# Patient Record
Sex: FEMALE | Race: WHITE
[De-identification: names, ages, dates, MRNs, and addresses within clinical notes are randomized per-mention and may not be internally consistent; named-entity substitution may affect disease eponyms.]

---

## 2018-05-05 ENCOUNTER — HOSPITAL ENCOUNTER (EMERGENCY)
Dept: HOSPITAL 36 - ER | Age: 23
LOS: 1 days | Discharge: ANOTHER HOSPITAL | End: 2018-05-06
Payer: MEDICAID

## 2018-05-05 DIAGNOSIS — Z3A.00: ICD-10-CM

## 2018-05-05 DIAGNOSIS — R10.30: ICD-10-CM

## 2018-05-05 DIAGNOSIS — F17.200: ICD-10-CM

## 2018-05-05 DIAGNOSIS — O00.00: Primary | ICD-10-CM

## 2018-05-05 LAB
ALBUMIN SERPL-MCNC: 4.4 GM/DL (ref 3.7–5.3)
ALBUMIN/GLOB SERPL: 1.9 {RATIO} (ref 1–1.8)
ALP SERPL-CCNC: 49 U/L (ref 34–104)
ALT SERPL-CCNC: 9 U/L (ref 7–52)
AMPHET UR-MCNC: NEGATIVE NG/ML
ANION GAP SERPL CALC-SCNC: 10.5 MMOL/L (ref 7–16)
APPEARANCE UR: CLEAR
AST SERPL-CCNC: 12 U/L (ref 13–39)
BACTERIA #/AREA URNS HPF: (no result) /HPF
BARBITURATES UR-MCNC: NEGATIVE UG/ML
BASOPHILS # BLD AUTO: 0.1 TH/CUMM (ref 0–0.2)
BASOPHILS NFR BLD AUTO: 0.5 % (ref 0–2)
BENZODIAZEPINES PNL UR: NEGATIVE
BILIRUB SERPL-MCNC: 0.5 MG/DL (ref 0.3–1)
BILIRUB UR-MCNC: (no result) MG/DL
BUN SERPL-MCNC: 15 MG/DL (ref 7–25)
CALCIUM SERPL-MCNC: 9.4 MG/DL (ref 8.6–10.3)
CANNABINOIDS SERPL QL CFM: POSITIVE
CHLORIDE SERPL-SCNC: 105 MEQ/L (ref 98–107)
CO2 SERPL-SCNC: 25.4 MEQ/L (ref 21–31)
COCAINE METAB.OTHER UR-MCNC: NEGATIVE NG/ML
COLOR UR: YELLOW
CREAT SERPL-MCNC: 0.7 MG/DL (ref 0.6–1.2)
EOSINOPHIL # BLD AUTO: 0.2 TH/CMM (ref 0.1–0.4)
EOSINOPHIL NFR BLD AUTO: 1.2 % (ref 0–5)
EPI CELLS URNS QL MICRO: (no result) /LPF
ERYTHROCYTE [DISTWIDTH] IN BLOOD BY AUTOMATED COUNT: 12.1 % (ref 11.5–20)
GLOBULIN SER-MCNC: 2.3 GM/DL
GLUCOSE SERPL-MCNC: 125 MG/DL (ref 70–105)
GLUCOSE UR STRIP-MCNC: NEGATIVE MG/DL
HCT VFR BLD CALC: 34.6 % (ref 41–60)
HGB BLD-MCNC: 11.6 GM/DL (ref 12–16)
KETONES UR STRIP-MCNC: NEGATIVE MG/DL
LEUKOCYTE ESTERASE UR-ACNC: NEGATIVE
LYMPHOCYTE AB SER FC-ACNC: 1.9 TH/CMM (ref 1.5–3)
LYMPHOCYTES NFR BLD AUTO: 13.8 % (ref 20–50)
MCH RBC QN AUTO: 30 PG (ref 27–31)
MCHC RBC AUTO-ENTMCNC: 33.4 PG (ref 28–36)
MCV RBC AUTO: 89.6 FL (ref 81–100)
METHADONE UR CFM-MCNC: NEGATIVE NG/ML
METHAMPHET UR QL: NEGATIVE
MICRO URNS: YES
MONOCYTES # BLD AUTO: 1.1 TH/CMM (ref 0.3–1)
MONOCYTES NFR BLD AUTO: 8.1 % (ref 2–10)
NEUTROPHILS # BLD: 10.3 TH/CMM (ref 1.8–8)
NEUTROPHILS NFR BLD AUTO: 76.4 % (ref 40–80)
NITRITE UR QL STRIP: NEGATIVE
OPIATES UR QL: NEGATIVE
PCP UR-MCNC: NEGATIVE UG/L
PH UR STRIP: 6 [PH] (ref 4.6–8)
PLATELET # BLD: 241 TH/CMM (ref 150–400)
PMV BLD AUTO: 7.9 FL
POTASSIUM SERPL-SCNC: 3.9 MEQ/L (ref 3.5–5.1)
PROT UR STRIP-MCNC: 30 MG/DL
RBC # BLD AUTO: 3.87 MIL/CMM (ref 3.8–5.1)
RBC # UR STRIP: (no result) /UL
RBC #/AREA URNS HPF: (no result) /HPF (ref 0–5)
SODIUM SERPL-SCNC: 137 MEQ/L (ref 136–145)
SP GR UR STRIP: >= 1.03 (ref 1–1.03)
TRICYCLICS UR QL: NEGATIVE
URINALYSIS COMPLETE PNL UR: (no result)
UROBILINOGEN UR STRIP-ACNC: 0.2 E.U./DL (ref 0.2–1)
WBC # BLD AUTO: 13.6 TH/CMM (ref 4.8–10.8)
WBC #/AREA URNS HPF: (no result) /HPF (ref 0–5)

## 2018-05-05 PROCEDURE — 99285 EMERGENCY DEPT VISIT HI MDM: CPT

## 2018-05-05 PROCEDURE — 96365 THER/PROPH/DIAG IV INF INIT: CPT

## 2018-05-05 PROCEDURE — 84703 CHORIONIC GONADOTROPIN ASSAY: CPT

## 2018-05-05 PROCEDURE — 87040 BLOOD CULTURE FOR BACTERIA: CPT

## 2018-05-05 PROCEDURE — 81001 URINALYSIS AUTO W/SCOPE: CPT

## 2018-05-05 PROCEDURE — 81025 URINE PREGNANCY TEST: CPT

## 2018-05-05 PROCEDURE — 85610 PROTHROMBIN TIME: CPT

## 2018-05-05 PROCEDURE — 85025 COMPLETE CBC W/AUTO DIFF WBC: CPT

## 2018-05-05 PROCEDURE — 80307 DRUG TEST PRSMV CHEM ANLYZR: CPT

## 2018-05-05 PROCEDURE — 96361 HYDRATE IV INFUSION ADD-ON: CPT

## 2018-05-05 PROCEDURE — 84702 CHORIONIC GONADOTROPIN TEST: CPT

## 2018-05-05 PROCEDURE — 96375 TX/PRO/DX INJ NEW DRUG ADDON: CPT

## 2018-05-05 PROCEDURE — 85007 BL SMEAR W/DIFF WBC COUNT: CPT

## 2018-05-05 PROCEDURE — 36415 COLL VENOUS BLD VENIPUNCTURE: CPT

## 2018-05-05 PROCEDURE — 85027 COMPLETE CBC AUTOMATED: CPT

## 2018-05-05 PROCEDURE — 76801 OB US < 14 WKS SINGLE FETUS: CPT

## 2018-05-05 PROCEDURE — 83605 ASSAY OF LACTIC ACID: CPT

## 2018-05-05 PROCEDURE — 85730 THROMBOPLASTIN TIME PARTIAL: CPT

## 2018-05-05 PROCEDURE — 80053 COMPREHEN METABOLIC PANEL: CPT

## 2018-05-05 NOTE — ED PHYSICIAN CHART
ED Chief Complaint/HPI





- Patient Information


Date Seen:: 05/05/18


Time Seen:: 20:43


Chief Complaint:: ABDOMINAL PAIN


History of Present Illness:: 





THIS IS A 23 YO FEMALE WITH PAIN IN THE LOWER ABDOMEN THAT STARTED ABOUT ONE 

MONTHS AGO AND IS NOW THE WORSE THAT IT HAS BEEN.


SHE DENIES ANY PREGNANCY BECAUSE HER DOCTOR GOT A NEGATIVE PREGNANCY TEST, 

PAINFUL URINATION AND VOMITED THIS PM.  SHE DENIES HAVING ANY PREVIOUS SURGERY 

AND HAS A TWO YR OLD AT HOME.  SHE DENIES FEVER AND VOMITING 


Vitals:: 





NOTED


Historian:: Patient


Review:: Nurse's Note Reviewed





ED Review of Systems





- Review of Systems


General/Constitutional: No fever, No chills, No weight loss, Weakness, 

Diaphoresis, No edema, No loss of appetite


Skin: No skin lesions, No rash, No bruising


Head: No headache, No light-headedness


Eyes: No loss of vision, No pain, No diplopia


ENT: No earache, No nasal drainage, No sore throat, No tinnitus


Neck: No neck pain, No swelling, No thyromegaly, No stiffness, No mass noted


Cardio Vascular: No chest pain, No palpitations, No PND, No orthopnea, No edema


Pulmonary: No SOB, No cough, No sputum, No wheezing


GI: Nausea, No vomiting, No diarrhea, Pain, No melena, No hematochezia, No 

constipation, No hematemesis


G/U: Dysuria, No frequency, No hematuria


Ob/Gyn: No vaginal discharge


Musculoskeletal: No bone or joint pain, No back pain, No muscle pain


Endocrine: No polyuria, No polydipsia


Psychiatric: No prior psych history, No depression, No anxiety, No suicidal 

ideation


Hematopoietic: No bruising, No lymphadenopathy


Allergic/Immuno: No urticaria, No angioedema


Neurological: No syncope, No focal symptoms, No weakness, No paresthesia, No 

headache, No seizure, No dizziness, No confusion, No vertigo





ED Past Medical History





- Past Medical History


Obtainable: Yes


Past Medical History: No significant medical hx


Family History: None


Social History: Smoker, No Alcohol, No Drug Use, Single, Lives With Parents


Surgical History: None


Psychiatricy History: None


Medication: Reviewed





Family Medical History





- Family Member


  ** Mother


History Unknown: Yes





ED Physical Exam





- Physical Examination


General/Constitutional: Awake, Well-developed, well-nourished, Alert, No 

distress, GCS 15, Non-toxic appearing, Ambulatory


Other Gen/Cons comments:: 





THE IS DIAPHORETIC AND PALE


Head: Atraumatic


Eyes: Lids, conjuctiva normal, PERRL, EOMI


Skin: Nl inspection, No rash, No skin lesions, No ecchymosis, Well hydrated, No 

lymphadenopathy


ENMT: External ears, nose nl, Nasal exam nl, Lips, teeth, gums nl


Neck: Nontender, Full ROM w/o pain, No JVD, No nuchal rigidity, No bruit, No 

mass, No stridor


Respiratory: Nl effort/Exclusion, Clear to Auscultation, No Wheeze/Rhonchi/Rales


Cardio Vascular: RRR, No murmur, gallop, rubs, NL S1 S2


GI: No tenderness/rebounding/guarding (DIFFUSE TENDERNESS IN THE LOWER 

ABDOMINAL AREA.), No organomegaly, No hernia, Normal BS's, Nondistended, No mass

/bruits, No McBurney tenderness


: No CVA tenderness


Extremities: No tenderness or effusion, Full ROM, normal strength in all 

extremities, No edema, Normal digits & nails


Neuro/Psych: Alert/oriented, DTR's symmetric, Normal sensory exam, Normal motor 

strength, Judgement/insight normal, Mood normal, Normal gait, No focal deficits


Misc: Normal back, No paraspinal tenderness





ED Labs/Radiology/EKG Results





- Lab Results


Results: 





 Abnormal Lab Results











  05/05/18 05/05/18 05/05/18





  20:50 20:50 20:50


 


WBC  13.6 H  


 


RBC  3.87  


 


Hgb  11.6 L  


 


Hct  34.6 L  


 


MCV  89.6  


 


MCH  30.0  


 


MCHC Differential  33.4  


 


RDW  12.1  


 


Plt Count  241  


 


MPV  7.9  


 


Neutrophils %  76.4  


 


Lymphocytes %  13.8 L  


 


Monocytes %  8.1  


 


Eosinophils %  1.2  


 


Basophils %  0.5  


 


Sodium   137 


 


Potassium   3.9 


 


Chloride   105 


 


Carbon Dioxide   25.4 


 


Anion Gap   10.5 


 


BUN   15 


 


Creatinine   0.7 


 


Est GFR ( Amer)   > 60.0 


 


Est GFR (Non-Af Amer)   > 60.0 


 


BUN/Creatinine Ratio   21.4 


 


Glucose   125 H 


 


Whole Bld Lactic Acid   


 


Calcium   9.4 


 


Total Bilirubin   0.5 


 


AST   12 L 


 


ALT   9 


 


Alkaline Phosphatase   49 


 


Total Protein   6.7 


 


Albumin   4.4 


 


Globulin   2.3 


 


Albumin/Globulin Ratio   1.9 H 


 


Serum Pregnancy, Qual    POSITIVE H


 


Urine Source   


 


Urine Color   


 


Urine Clarity   


 


Urine pH   


 


Ur Specific Gravity   


 


Urine Protein   


 


Urine Glucose (UA)   


 


Urine Ketones   


 


Urine Blood   


 


Urine Nitrate   


 


Urine Bilirubin   


 


Urine Urobilinogen   


 


Ur Leukocyte Esterase   


 


Urine RBC   


 


Urine WBC   


 


Ur Epithelial Cells   


 


Urine Bacteria   


 


Urine Mucus   


 


Urine Pregnancy Test   


 


Urine Opiates Screen   


 


Urine Methadone Screen   


 


Ur Barbiturates Screen   


 


Ur Tricyclics Screen   


 


Ur Phencyclidine Scrn   


 


Amphetamines Screen   


 


U Methamphetamines Scrn   


 


U Benzodiazepines Scrn   


 


U Cocaine Metab Screen   


 


U Cannabinoids Screen   














  05/05/18 05/05/18 05/05/18





  21:25 21:30 21:30


 


WBC   


 


RBC   


 


Hgb   


 


Hct   


 


MCV   


 


MCH   


 


MCHC Differential   


 


RDW   


 


Plt Count   


 


MPV   


 


Neutrophils %   


 


Lymphocytes %   


 


Monocytes %   


 


Eosinophils %   


 


Basophils %   


 


Sodium   


 


Potassium   


 


Chloride   


 


Carbon Dioxide   


 


Anion Gap   


 


BUN   


 


Creatinine   


 


Est GFR ( Amer)   


 


Est GFR (Non-Af Amer)   


 


BUN/Creatinine Ratio   


 


Glucose   


 


Whole Bld Lactic Acid  0.75  


 


Calcium   


 


Total Bilirubin   


 


AST   


 


ALT   


 


Alkaline Phosphatase   


 


Total Protein   


 


Albumin   


 


Globulin   


 


Albumin/Globulin Ratio   


 


Serum Pregnancy, Qual   


 


Urine Source   CLEAN C 


 


Urine Color   YELLOW 


 


Urine Clarity   CLEAR 


 


Urine pH   6.0 


 


Ur Specific Gravity   >= 1.030 


 


Urine Protein   30 H 


 


Urine Glucose (UA)   NEGATIVE 


 


Urine Ketones   NEGATIVE 


 


Urine Blood   SMALL H 


 


Urine Nitrate   NEGATIVE 


 


Urine Bilirubin   SMALL H 


 


Urine Urobilinogen   0.2 


 


Ur Leukocyte Esterase   NEGATIVE 


 


Urine RBC   0-2 


 


Urine WBC   0-2 


 


Ur Epithelial Cells   FEW 


 


Urine Bacteria   NONE SEEN 


 


Urine Mucus   FEW 


 


Urine Pregnancy Test    POSITIVE


 


Urine Opiates Screen   


 


Urine Methadone Screen   


 


Ur Barbiturates Screen   


 


Ur Tricyclics Screen   


 


Ur Phencyclidine Scrn   


 


Amphetamines Screen   


 


U Methamphetamines Scrn   


 


U Benzodiazepines Scrn   


 


U Cocaine Metab Screen   


 


U Cannabinoids Screen   














  05/05/18





  21:30


 


WBC 


 


RBC 


 


Hgb 


 


Hct 


 


MCV 


 


MCH 


 


MCHC Differential 


 


RDW 


 


Plt Count 


 


MPV 


 


Neutrophils % 


 


Lymphocytes % 


 


Monocytes % 


 


Eosinophils % 


 


Basophils % 


 


Sodium 


 


Potassium 


 


Chloride 


 


Carbon Dioxide 


 


Anion Gap 


 


BUN 


 


Creatinine 


 


Est GFR ( Amer) 


 


Est GFR (Non-Af Amer) 


 


BUN/Creatinine Ratio 


 


Glucose 


 


Whole Bld Lactic Acid 


 


Calcium 


 


Total Bilirubin 


 


AST 


 


ALT 


 


Alkaline Phosphatase 


 


Total Protein 


 


Albumin 


 


Globulin 


 


Albumin/Globulin Ratio 


 


Serum Pregnancy, Qual 


 


Urine Source 


 


Urine Color 


 


Urine Clarity 


 


Urine pH 


 


Ur Specific Gravity 


 


Urine Protein 


 


Urine Glucose (UA) 


 


Urine Ketones 


 


Urine Blood 


 


Urine Nitrate 


 


Urine Bilirubin 


 


Urine Urobilinogen 


 


Ur Leukocyte Esterase 


 


Urine RBC 


 


Urine WBC 


 


Ur Epithelial Cells 


 


Urine Bacteria 


 


Urine Mucus 


 


Urine Pregnancy Test 


 


Urine Opiates Screen  NEGATIVE


 


Urine Methadone Screen  NEGATIVE


 


Ur Barbiturates Screen  NEGATIVE


 


Ur Tricyclics Screen  NEGATIVE


 


Ur Phencyclidine Scrn  NEGATIVE


 


Amphetamines Screen  NEGATIVE


 


U Methamphetamines Scrn  NEGATIVE


 


U Benzodiazepines Scrn  NEGATIVE


 


U Cocaine Metab Screen  NEGATIVE


 


U Cannabinoids Screen  POSITIVE H














- Radiology Results


Results: 





ULTRASOUND OF THE PELVIS. THERE IS NO PREGNANCY IN THE UTERUS AND LOTS OF FREE 

FLUIDS IS NOTED IN THE PELVIC AREA.





ED Assessment





- Assessment


General Assessment: 





THIS PATIENT HAS ALL THE SIGNS OF ECTOPIC PREGNANCY SINCE SHE IS NOW HAVING 

PAIN IN HER SHOULDERS.


SINCE WE DO NOT HAVE OB-GYN AT THIS HOSPITAL AN EFFORT TO TRANSFER TO THE ER 

[facility] TO DR BASSETT.


 ACCEPTS THE PATIENT TO ER FOR TREATMENT.








ED Septic Shock





- .


Is Septic Shock (SBP<90, OR Lactate>4 mmol\L) present?: No





ED Reassessment (Disposition)





- Diagnosis


Diagnosis:: 





ECTOPIC PREGNANCY 





- Patient Disposition


Discharge/Transfer:: Acute Care (other hosp)





ED Discharge Plan





- Patient Disposition


Admit/Discharge/Transfer: TRANSFER TO ACUTE HOSP


Condition at Disposition: Improved


Additional Instructions: 


SPOKE TO DR. AZAR AND GAVE THE VITALS OVER THE PHONE AND THE CRITICAL LABS. 

HE AGREED TO A HIGHER LEVEL OF CARE.


911 WAS CALLED BECAUSE NO [facility] COULD PICK THE PATIENT SOON ENOUGH.

## 2018-05-06 ENCOUNTER — HOSPITAL ENCOUNTER (OUTPATIENT)
Dept: HOSPITAL 4 - SED | Age: 23
Discharge: HOME | End: 2018-05-06
Attending: SPECIALIST
Payer: MEDICAID

## 2018-05-06 VITALS — WEIGHT: 151 LBS | HEIGHT: 67 IN | SYSTOLIC BLOOD PRESSURE: 115 MMHG | BODY MASS INDEX: 23.7 KG/M2

## 2018-05-06 VITALS — SYSTOLIC BLOOD PRESSURE: 99 MMHG

## 2018-05-06 VITALS — SYSTOLIC BLOOD PRESSURE: 97 MMHG

## 2018-05-06 DIAGNOSIS — Z88.8: ICD-10-CM

## 2018-05-06 DIAGNOSIS — Z88.0: ICD-10-CM

## 2018-05-06 DIAGNOSIS — O00.102: Primary | ICD-10-CM

## 2018-05-06 LAB
BASOPHILS # BLD AUTO: 0.1 K/UL (ref 0–0.2)
BASOPHILS # BLD AUTO: 0.1 TH/CUMM (ref 0–0.2)
BASOPHILS NFR BLD AUTO: 0.5 % (ref 0–2)
BASOPHILS NFR BLD AUTO: 1.2 % (ref 0–2)
EOSINOPHIL # BLD AUTO: 0 K/UL (ref 0–0.4)
EOSINOPHIL # BLD AUTO: 0 TH/CMM (ref 0.1–0.4)
EOSINOPHIL NFR BLD AUTO: 0.2 % (ref 0–4)
EOSINOPHIL NFR BLD AUTO: 0.2 % (ref 0–5)
ERYTHROCYTE [DISTWIDTH] IN BLOOD BY AUTOMATED COUNT: 12.3 % (ref 9–15)
ERYTHROCYTE [DISTWIDTH] IN BLOOD BY AUTOMATED COUNT: 12.4 % (ref 11.5–20)
HCT VFR BLD AUTO: 27.4 % (ref 36–48)
HCT VFR BLD AUTO: 28.1 % (ref 36–48)
HCT VFR BLD AUTO: 29.7 % (ref 36–48)
HCT VFR BLD CALC: 29.5 % (ref 41–60)
HGB BLD-MCNC: 10.3 G/DL (ref 12–16)
HGB BLD-MCNC: 9.3 G/DL (ref 12–16)
HGB BLD-MCNC: 9.6 G/DL (ref 12–16)
HGB BLD-MCNC: 9.9 GM/DL (ref 12–16)
INR PPP: 1.07 (ref 0.5–1.4)
LYMPHOCYTE AB SER FC-ACNC: 0.9 TH/CMM (ref 1.5–3)
LYMPHOCYTES # BLD AUTO: 1.2 K/UL (ref 1–5.5)
LYMPHOCYTES NFR BLD AUTO: 11.5 % (ref 20.5–51.5)
LYMPHOCYTES NFR BLD AUTO: 7.2 % (ref 20–50)
MCH RBC QN AUTO: 30 PG (ref 27–31)
MCH RBC QN AUTO: 31 PG (ref 27–31)
MCHC RBC AUTO-ENTMCNC: 33.5 PG (ref 28–36)
MCHC RBC AUTO-ENTMCNC: 35 % (ref 32–36)
MCV RBC AUTO: 89 FL (ref 79–98)
MCV RBC AUTO: 89.6 FL (ref 81–100)
MONOCYTES # BLD AUTO: 0.7 TH/CMM (ref 0.3–1)
MONOCYTES # BLD MANUAL: 0.7 K/UL (ref 0–1)
MONOCYTES # BLD MANUAL: 6.7 % (ref 1.7–9.3)
MONOCYTES NFR BLD AUTO: 6.1 % (ref 2–10)
NEUTROPHILS # BLD AUTO: 8.8 K/UL (ref 1.8–7.7)
NEUTROPHILS # BLD: 10.3 TH/CMM (ref 1.8–8)
NEUTROPHILS NFR BLD AUTO: 81.1 % (ref 40–70)
NEUTROPHILS NFR BLD AUTO: 85.3 % (ref 40–80)
PLATELET # BLD AUTO: 213 K/UL (ref 130–430)
PLATELET # BLD: 192 TH/CMM (ref 150–400)
PMV BLD AUTO: 7.8 FL
PROTHROMBIN TIME: 11.1 SECONDS (ref 9.5–11.5)
RBC # BLD AUTO: 3.3 MIL/CMM (ref 3.8–5.1)
RBC # BLD AUTO: 3.35 MIL/UL (ref 4.2–6.2)
WBC # BLD AUTO: 10.8 K/UL (ref 4.8–10.8)
WBC # BLD AUTO: 12 TH/CMM (ref 4.8–10.8)

## 2018-05-06 PROCEDURE — 86886 COOMBS TEST INDIRECT TITER: CPT

## 2018-05-06 PROCEDURE — 86900 BLOOD TYPING SEROLOGIC ABO: CPT

## 2018-05-06 PROCEDURE — 36415 COLL VENOUS BLD VENIPUNCTURE: CPT

## 2018-05-06 PROCEDURE — 88305 TISSUE EXAM BY PATHOLOGIST: CPT

## 2018-05-06 PROCEDURE — 86901 BLOOD TYPING SEROLOGIC RH(D): CPT

## 2018-05-06 PROCEDURE — 84702 CHORIONIC GONADOTROPIN TEST: CPT

## 2018-05-06 PROCEDURE — 76801 OB US < 14 WKS SINGLE FETUS: CPT

## 2018-05-06 PROCEDURE — 85018 HEMOGLOBIN: CPT

## 2018-05-06 PROCEDURE — 85025 COMPLETE CBC W/AUTO DIFF WBC: CPT

## 2018-05-06 PROCEDURE — 87081 CULTURE SCREEN ONLY: CPT

## 2018-05-06 PROCEDURE — C1782 MORCELLATOR: HCPCS

## 2018-05-06 PROCEDURE — 59151 TREAT ECTOPIC PREGNANCY: CPT

## 2018-05-06 PROCEDURE — C1727 CATH, BAL TIS DIS, NON-VAS: HCPCS

## 2018-05-06 RX ADMIN — OXYCODONE HYDROCHLORIDE AND ACETAMINOPHEN PRN TAB: 5; 325 TABLET ORAL at 17:37

## 2018-05-06 RX ADMIN — OXYCODONE HYDROCHLORIDE AND ACETAMINOPHEN PRN TAB: 5; 325 TABLET ORAL at 13:48

## 2018-05-06 NOTE — DIAGNOSTIC IMAGING REPORT
OB ultrasound



HISTORY: Pain, positive pregnancy test



Transvaginal and transabdominal sonographic technique utilized, positive

pregnancy test The uterus measures 9.4 x 3.9 x 4.8 cm.  No focal

myometrial lesions are seen.  The endometrium measures 3 mm thickness. 

No intrauterine gestation identified.



The right ovary measures 3.1 x 3.1 cm.  Subcentimeter cysts are seen. 

The left ovary measures 2.2 x 2.5 cm.  Subcentimeter cysts are seen.



There is a small amount of free fluid in the lower pelvis.



IMPRESSION:

1.  No intrauterine gestation identified.  IN THE PRESENCE OF A

POSITIVE PREGNANCY TEST, ECTOPIC GESTATION MUST BE EXCLUDED.

2.  Subcentimeter bilateral ovarian cystic changes

3.  Small amount of free fluid in the lower pelvis.  Again, the findings

should be correlated clinically and with the menstrual status.